# Patient Record
Sex: MALE | Race: WHITE
[De-identification: names, ages, dates, MRNs, and addresses within clinical notes are randomized per-mention and may not be internally consistent; named-entity substitution may affect disease eponyms.]

---

## 2018-07-16 ENCOUNTER — HOSPITAL ENCOUNTER (OUTPATIENT)
Dept: HOSPITAL 88 - OR | Age: 58
Discharge: HOME | End: 2018-07-16
Attending: INTERNAL MEDICINE
Payer: COMMERCIAL

## 2018-07-16 DIAGNOSIS — Z80.0: ICD-10-CM

## 2018-07-16 DIAGNOSIS — D12.2: ICD-10-CM

## 2018-07-16 DIAGNOSIS — Z12.11: Primary | ICD-10-CM

## 2018-07-16 DIAGNOSIS — K57.30: ICD-10-CM

## 2018-07-16 DIAGNOSIS — K31.7: ICD-10-CM

## 2018-07-16 DIAGNOSIS — Q40.2: ICD-10-CM

## 2018-07-16 DIAGNOSIS — M21.372: ICD-10-CM

## 2018-07-16 DIAGNOSIS — Z86.73: ICD-10-CM

## 2018-07-16 DIAGNOSIS — K31.89: ICD-10-CM

## 2018-07-16 DIAGNOSIS — K29.70: ICD-10-CM

## 2018-07-16 DIAGNOSIS — D12.0: ICD-10-CM

## 2018-07-16 DIAGNOSIS — K20.9: ICD-10-CM

## 2018-07-16 DIAGNOSIS — K64.8: ICD-10-CM

## 2018-07-16 DIAGNOSIS — Z01.810: ICD-10-CM

## 2018-07-16 DIAGNOSIS — K25.9: ICD-10-CM

## 2018-07-16 DIAGNOSIS — Z79.82: ICD-10-CM

## 2018-07-16 DIAGNOSIS — R03.0: ICD-10-CM

## 2018-07-16 PROCEDURE — 45384 COLONOSCOPY W/LESION REMOVAL: CPT

## 2018-07-16 PROCEDURE — 45385 COLONOSCOPY W/LESION REMOVAL: CPT

## 2018-07-16 PROCEDURE — 45378 DIAGNOSTIC COLONOSCOPY: CPT

## 2018-07-16 PROCEDURE — 43239 EGD BIOPSY SINGLE/MULTIPLE: CPT

## 2018-07-16 PROCEDURE — 93005 ELECTROCARDIOGRAM TRACING: CPT

## 2018-07-16 NOTE — OPERATIVE REPORT
DATE OF PROCEDURE:  July 16, 2018 



REFERRING PHYSICIAN:  Dr. Tr Roberto

 

PROCEDURES PERFORMED

1. Esophagogastroduodenoscopy with biopsies.  

2. Colonoscopy with polypectomy.  



INDICATIONS FOR EGD:  Dyspepsia.  



INDICATIONS FOR COLONOSCOPY:  Colorectal cancer screening.



MEDICATION:  Patient was done under MAC.  Please see anesthesiologist's 

note.



PROCEDURE:  With the patient in the left lateral decubitus position, the 

flexible fiberoptic Olympus gastroscope was introduced into the esophagus 

under direct visualization without any difficulty.  There was some patchy 

erythema noted in the distal esophagus.  The scope was then advanced with 

ease into the stomach.  The mucosa overlying the antrum and the body 

revealed  low-grade to moderate edema, and biopsies were obtained and sent 

to stain for H. pylori.  An approximately 1.2-cm, sessile, polypoid lesion 

with a minute ulcer was noted also in the antrum, and biopsies were 

obtained.  The pylorus was of normal contour and shape.  It was intubated 

with ease, and the scope was advanced all the way to the 2nd portion of the 

duodenum.  The scope was then withdrawn slowly.  Mucosa overlying the 

proximal 2nd portion appeared to be within normal limits.  A cluster of 

nodules was noted along the anterior wall of the duodenal bulb, and 

biopsies were obtained.  The scope was then withdrawn back into the stomach 

and retroflexed.  Mucosa overlying the fundus and the cardia appeared to be 

within normal limits.  The scope was then straightened out.  The stomach 

was decompressed.  Scope was subsequently withdrawn.  Patient tolerated the 

procedure well.



IMPRESSION

1. Distal esophagitis, mild.

2. Gastritis, biopsied.  Biopsies sent to stain for H. pylori.

3. Duodenal bulb, clusters of nodules, anterior wall, biopsied.



PLAN:  Follow up histology.  Initiate Protonix 40 mg 1 p.o. q.a.m. a.c.



The patient was then turned around.  After adequate lubrication of the anal 

canal, a flexible fiberoptic Olympus colonoscope was inserted into the 

rectum with ease and advanced all the way to the cecum.  One polyp was 

snared from the cecum.  The scope was then withdrawn slowly, and a minute 

polyp was hot biopsied from the ascending colon.  The transverse appeared 

to be within normal limits.  Diverticular disease was noted to involve the 

distal descending and the sigmoid colon.  The rectum appeared to be within 

normal limits.  The scope was then retroflexed into the distal rectum, and 

small internal hemorrhoids were noted, none of which was actively bleeding. 

 The scope was then straightened out.  The rectosigmoid area as well as the 

distal rectal area were decompressed.  Scope was subsequently withdrawn.  

Patient tolerated the procedure well.  



IMPRESSION

1. Cecal polyp, snared.  

2. Ascending colon polyp, hot biopsied.

3. Diverticulosis.

4. Internal hemorrhoids, none actively bleeding.  



PLAN:  Follow up histology.  Initiate high-fiber, low-fat diet.  Initiate 

high-fiber supplement.  Patient might benefit from a followup colonoscopy 

in 3 to 5 years.  





DD:  07/16/2018 11:20

DT:  07/16/2018 11:36

Job#:  [id number] 



cc:TR ROBERTO MD

## 2018-08-19 ENCOUNTER — HOSPITAL ENCOUNTER (OUTPATIENT)
Dept: HOSPITAL 88 - ER | Age: 58
Setting detail: OBSERVATION
LOS: 1 days | Discharge: HOME | End: 2018-08-20
Attending: INTERNAL MEDICINE | Admitting: INTERNAL MEDICINE
Payer: COMMERCIAL

## 2018-08-19 VITALS — BODY MASS INDEX: 27.64 KG/M2 | HEIGHT: 70 IN | WEIGHT: 193.06 LBS

## 2018-08-19 VITALS — DIASTOLIC BLOOD PRESSURE: 70 MMHG | SYSTOLIC BLOOD PRESSURE: 127 MMHG

## 2018-08-19 VITALS — DIASTOLIC BLOOD PRESSURE: 78 MMHG | SYSTOLIC BLOOD PRESSURE: 128 MMHG

## 2018-08-19 VITALS — DIASTOLIC BLOOD PRESSURE: 86 MMHG | SYSTOLIC BLOOD PRESSURE: 176 MMHG

## 2018-08-19 VITALS — DIASTOLIC BLOOD PRESSURE: 66 MMHG | SYSTOLIC BLOOD PRESSURE: 115 MMHG

## 2018-08-19 DIAGNOSIS — R94.31: ICD-10-CM

## 2018-08-19 DIAGNOSIS — R01.1: ICD-10-CM

## 2018-08-19 DIAGNOSIS — I34.0: ICD-10-CM

## 2018-08-19 DIAGNOSIS — I50.32: ICD-10-CM

## 2018-08-19 DIAGNOSIS — I11.0: ICD-10-CM

## 2018-08-19 DIAGNOSIS — Z82.49: ICD-10-CM

## 2018-08-19 DIAGNOSIS — I69.354: ICD-10-CM

## 2018-08-19 DIAGNOSIS — R55: ICD-10-CM

## 2018-08-19 DIAGNOSIS — I20.8: Primary | ICD-10-CM

## 2018-08-19 LAB
ALBUMIN SERPL-MCNC: 4.2 G/DL (ref 3.5–5)
ALBUMIN/GLOB SERPL: 1.2 {RATIO} (ref 0.8–2)
ALP SERPL-CCNC: 53 IU/L (ref 40–150)
ALT SERPL-CCNC: 20 IU/L (ref 0–55)
ANION GAP SERPL CALC-SCNC: 15 MMOL/L (ref 8–16)
BACTERIA URNS QL MICRO: (no result) /HPF
BASOPHILS # BLD AUTO: 0 10*3/UL (ref 0–0.1)
BASOPHILS NFR BLD AUTO: 0.5 % (ref 0–1)
BILIRUB UR QL: NEGATIVE
BUN SERPL-MCNC: 13 MG/DL (ref 7–26)
BUN/CREAT SERPL: 15 (ref 6–25)
CALCIUM SERPL-MCNC: 9.3 MG/DL (ref 8.4–10.2)
CHLORIDE SERPL-SCNC: 103 MMOL/L (ref 98–107)
CHOLEST SERPL-MCNC: 208 MD/DL (ref 0–199)
CHOLEST/HDLC SERPL: 2.3 {RATIO} (ref 3.9–4.7)
CK MB SERPL-MCNC: 2.5 NG/ML (ref 0–5)
CK MB SERPL-MCNC: 3.8 NG/ML (ref 0–5)
CK SERPL-CCNC: 145 IU/L (ref 30–200)
CK SERPL-CCNC: 99 IU/L (ref 30–200)
CLARITY UR: CLEAR
CO2 SERPL-SCNC: 25 MMOL/L (ref 22–29)
COLOR UR: YELLOW
DEPRECATED APTT PLAS QN: 27.7 SECONDS (ref 23.8–35.5)
DEPRECATED INR PLAS: 1.15
DEPRECATED NEUTROPHILS # BLD AUTO: 3.7 10*3/UL (ref 2.1–6.9)
DEPRECATED RBC URNS MANUAL-ACNC: (no result) /HPF (ref 0–5)
EGFRCR SERPLBLD CKD-EPI 2021: > 60 ML/MIN (ref 60–?)
EOSINOPHIL # BLD AUTO: 0.2 10*3/UL (ref 0–0.4)
EOSINOPHIL NFR BLD AUTO: 2.6 % (ref 0–6)
EPI CELLS URNS QL MICRO: (no result) /LPF
ERYTHROCYTE [DISTWIDTH] IN CORD BLOOD: 12.6 % (ref 11.7–14.4)
GLOBULIN PLAS-MCNC: 3.4 G/DL (ref 2.3–3.5)
GLUCOSE SERPLBLD-MCNC: 111 MG/DL (ref 74–118)
HCT VFR BLD AUTO: 44.9 % (ref 38.2–49.6)
HDLC SERPL-MSCNC: 89 MG/DL (ref 40–60)
HGB BLD-MCNC: 15.1 G/DL (ref 14–18)
KETONES UR QL STRIP.AUTO: NEGATIVE
LDLC SERPL CALC-MCNC: 104 MG/DL (ref 60–130)
LEUKOCYTE ESTERASE UR QL STRIP.AUTO: NEGATIVE
LYMPHOCYTES # BLD: 1.3 10*3/UL (ref 1–3.2)
LYMPHOCYTES NFR BLD AUTO: 22.7 % (ref 18–39.1)
MCH RBC QN AUTO: 32.1 PG (ref 28–32)
MCHC RBC AUTO-ENTMCNC: 33.6 G/DL (ref 31–35)
MCV RBC AUTO: 95.5 FL (ref 81–99)
MONOCYTES # BLD AUTO: 0.6 10*3/UL (ref 0.2–0.8)
MONOCYTES NFR BLD AUTO: 10.1 % (ref 4.4–11.3)
NEUTS SEG NFR BLD AUTO: 63.8 % (ref 38.7–80)
NITRITE UR QL STRIP.AUTO: NEGATIVE
PLATELET # BLD AUTO: 189 X10E3/UL (ref 140–360)
POTASSIUM SERPL-SCNC: 4 MMOL/L (ref 3.5–5.1)
PROT UR QL STRIP.AUTO: NEGATIVE
PROTHROMBIN TIME: 13.8 SECONDS (ref 11.9–14.5)
RBC # BLD AUTO: 4.7 X10E6/UL (ref 4.3–5.7)
SODIUM SERPL-SCNC: 139 MMOL/L (ref 136–145)
SP GR UR STRIP: 1.01 (ref 1.01–1.02)
TRIGL SERPL-MCNC: 77 MG/DL (ref 0–149)
UROBILINOGEN UR STRIP-MCNC: 0.2 MG/DL (ref 0.2–1)
WBC #/AREA URNS HPF: (no result) /HPF (ref 0–5)

## 2018-08-19 PROCEDURE — 93005 ELECTROCARDIOGRAM TRACING: CPT

## 2018-08-19 PROCEDURE — 99284 EMERGENCY DEPT VISIT MOD MDM: CPT

## 2018-08-19 PROCEDURE — 80053 COMPREHEN METABOLIC PANEL: CPT

## 2018-08-19 PROCEDURE — 85025 COMPLETE CBC W/AUTO DIFF WBC: CPT

## 2018-08-19 PROCEDURE — 36415 COLL VENOUS BLD VENIPUNCTURE: CPT

## 2018-08-19 PROCEDURE — 70450 CT HEAD/BRAIN W/O DYE: CPT

## 2018-08-19 PROCEDURE — 78452 HT MUSCLE IMAGE SPECT MULT: CPT

## 2018-08-19 PROCEDURE — 93017 CV STRESS TEST TRACING ONLY: CPT

## 2018-08-19 PROCEDURE — 84443 ASSAY THYROID STIM HORMONE: CPT

## 2018-08-19 PROCEDURE — 85730 THROMBOPLASTIN TIME PARTIAL: CPT

## 2018-08-19 PROCEDURE — 93306 TTE W/DOPPLER COMPLETE: CPT

## 2018-08-19 PROCEDURE — 82553 CREATINE MB FRACTION: CPT

## 2018-08-19 PROCEDURE — 80061 LIPID PANEL: CPT

## 2018-08-19 PROCEDURE — 80048 BASIC METABOLIC PNL TOTAL CA: CPT

## 2018-08-19 PROCEDURE — 84484 ASSAY OF TROPONIN QUANT: CPT

## 2018-08-19 PROCEDURE — 85610 PROTHROMBIN TIME: CPT

## 2018-08-19 PROCEDURE — 82550 ASSAY OF CK (CPK): CPT

## 2018-08-19 PROCEDURE — 93880 EXTRACRANIAL BILAT STUDY: CPT

## 2018-08-19 PROCEDURE — 81001 URINALYSIS AUTO W/SCOPE: CPT

## 2018-08-19 RX ADMIN — FAMOTIDINE SCH MG: 20 TABLET, FILM COATED ORAL at 23:45

## 2018-08-19 RX ADMIN — FAMOTIDINE SCH MG: 20 TABLET, FILM COATED ORAL at 13:06

## 2018-08-19 RX ADMIN — NITROGLYCERIN SCH GM: 20 OINTMENT TOPICAL at 17:01

## 2018-08-19 RX ADMIN — NITROGLYCERIN SCH GM: 20 OINTMENT TOPICAL at 13:07
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
## 2018-08-19 NOTE — CONSULTATION
DATE OF CONSULTATION:  August 19, 2018 





CARDIOLOGY CONSULTATION



REASON FOR CONSULTATION:  Left arm and shoulder pain, near syncope.



HISTORY OF PRESENT ILLNESS:  Mr. Brown is a 58-year-old gentleman with 

past medical history of subacute bacterial endocarditis diagnosed 4 years 

ago with complication of right MCA distribution stroke resulting in 

left-sided body weakness with majority of recovery with exception of some 

footdrop in his left foot.  Since that time, patient has changed his entire 

lifestyle.  He has taken quite a bit of interest in his health and is 

exercising most days of the week between 3:30 to 4:15 in the morning doing 

a combination of weight exercise and cardio training.  Patient reports 

typically he is able to do 30 minutes of treadmill exercise at a time 

without any issues; however, yesterday had an episode which scared him, 

prompting him to come.  He reports that with 15 minutes into his exercise 

protocol, patient felt weak, near syncopal, and had severe tightness in his 

left posterior neck and left shoulder region.  In fact, symptoms were so 

severe he ended up stopping.  He felt overall malaise and not quite himself 

and decided to terminate his exercise routine.  He spoke with his mom who 

is a nurse, who then recommended he should  himself checked out, 

hence coming into the emergency room.  Since he has been here, largely he 

is more or less back to baseline.  He does report some occasional left body 

pain that happens off and on in relation to his stroke.  He denies any 

orthopnea, lower extremity edema, or palpitations.  He has had blacking out 

episodes in the past many, many years ago.  As a child, he does report 

missing 6 weeks of school in the first grade and he thinks this may be 

related to his heart condition.



PAST MEDICAL HISTORY:

1. Childhood illness in first grade, was out for 6 weeks, questionable 

rheumatic disease.

2. Subacute bacterial endocarditis, July 15, 2013, underwent 6 weeks of 

antibiotic therapy, his course is complicated by stroke, resulting in 

left-sided body weakness with reasonable recovery with the exception of 

some slight left footdrop.

3. Esophagitis and gastritis, diagnosed on EGD in July of 2018.

4. Colonoscopy with polyps, done in July of 2018, have been removed.



FAMILY HISTORY:  Mother alive at 86, has history of prior syncope.  Father 

alive at 85 with moderate cognitive impairment and Parkinson's disease.



SOCIAL HISTORY:  He is a lifelong nonsmoker.  Has occasional alcohol.  

Denies any illicit drug use.



ALLERGIES:  NO KNOWN DRUG ALLERGIES.



HOME MEDICATIONS:

1. Ambien 10 mg nightly.

2. Protonix 40 mg daily.



REVIEW OF SYSTEMS:

GENERAL:  Positive for fatigue and malaise.  Denies any fevers, chills, 

weight changes.

HEENT:  No headaches, visual complaints, sore throat, stuffy nose.

RESPIRATORY:  Denies any pleuritic chest pain.  Denies any exertional 

dyspnea.

CARDIOVASCULAR:  As per HPI.

GI:  Denies any nausea, vomiting, bright red blood per rectum, melena, 

hematemesis.

:  Denies any dysuria, pyuria, or changes in urinary frequency.

MUSCULOSKELETAL:  Positive for left footdrop and slight left-sided body 

weakness related to stroke, and left-sided arm and leg pain from time to 

time, but denies any typical claudication symptoms or lower extremity 

edema.

SKIN:  No rashes or breakdown.

ID:  Denies any infectious issues except for the history of endocarditis.

NEUROLOGIC:  Positive for right MCA distribution stroke with left-sided 

body weakness.



Remainder of review of systems negative otherwise mentioned.



PHYSICAL EXAMINATION:

VITAL SIGNS:  Temperature of 97.7, pulse of 62, respiratory rate 18, blood 

pressure 131/72, O2 sat 97% on room air.

GENERAL:  This is a well-nourished, well-developed gentleman, who is 

currently in no apparent distress.

HEENT:  Pupils are equal, round, and reactive to light.  Extraocular 

movements are intact.  Oropharynx is clear.

NECK:  No elevation of jugular venous pulsation.  Faint right carotid bruit 

versus transmitted murmur.

CARDIOVASCULAR:  Regular rate and rhythm.  Normal S1 and S2.  3/6 systolic 

murmur at the left lower sternal border and apical region.

LUNGS:  Clear to auscultation bilaterally with good air entry.

ABDOMEN:  Skinny, nontender, nondistended with normoactive bowel sounds.  

No hepatosplenomegaly.

BACK:  No costovertebral angle tenderness.

EXTREMITIES:  Warm with 2+ bilateral radial pulses, 2+ bilateral femoral 

pulses, and 2+ pedal pulses.

NEUROLOGIC:  Cranial nerves II-XII are intact.  Strength is 5-/5 in the 

upper and lower extremity.  Right side is neurologically intact.



LABS:  White count of 5.9, hemoglobin 15.1, hematocrit 44.9, platelets of 

189,000.  Sodium 139, potassium 4.0, chloride 102, bicarb 25, BUN 13, 

creatinine is 0.84, glucose of 111, calcium of 9.3.  AST 20, ALT 20, alk 

phos 53.  Total protein 7.6, albumin of 4.2.  , MB of 3.8, troponin 

of 0.023.  CT brain shows old right MCA distribution stroke.  EKG reveals 

normal sinus rhythm and nonspecific T-wave inversions in the lateral 

precordial leads.



DIAGNOSES:

1. Questionable equivalent angina type episode.

2. Near syncope.

3. Systolic murmur in the mitral position concerning for mitral valve 

pathology.

4. History of bacterial endocarditis in the remote past, resulting in 

stroke and left-sided body weakness.

5. Abnormal electrocardiogram.



PLAN/RECOMMENDATIONS:

1. Will go ahead and check echocardiogram to evaluate his heart 

structurally.

2. Cycle serial cardiac enzymes.

3. Telemetry monitoring to look for any occult arrhythmias.

4. Proceed with nuclear cardiac stress test, may need to convert to a 

pharmacologic stress test on account of his neuro deficit specifically 

being his left footdrop.

5. Check TSH, lipid profile.

6. Will continue to monitor this patient.

7. Long discussion with patient who is agreeable with the plan.







DD:  08/19/2018 12:24

DT:  08/19/2018 20:31

Job#:  [id number] DR PAPPAS

## 2018-08-19 NOTE — DIAGNOSTIC IMAGING REPORT
Exam: Head CT without contrast

History:  Dizziness, CVA

Comparison studies:  None



Technique:

Axial images were obtained from the skull base to the vertex.

Coronal and sagittal images reconstructed from the axial data.

Radiation dose: Total DLP: 921 mGy*cm. Estimated effective dose: DLP x 0.015 

Intravenous contrast: None



Findings:



Scalp: No abnormalities.

Bones: No fractures, blastic or lytic lesions.



Brain sulci: Mildly prominent.

Ventricles: Ex vacuo dilatation of the atria of the left lateral ventricle due

to volume loss from chronic insult described below. Extra-axial spaces: No

masses, no fluid collection. 



Parenchyma: 

No mass, acute hemorrhage or acute cortical vascular insult. Chronic insult

with encephalomalacia and gliosis centered along the right supramarginal gyrus

and right side central gyrus in the right MCA territory. There are punctate

right perisylvian MCA vascular calcifications adjacent to the infarct. 



Sellar/suprasellar region: No abnormalities.

Craniocervical junction: Patent foramen magnum. No Chiari one malformation.



Incidental findings: 

Punctate left middle frontal calcification may be vascular in etiology or

possibly dystrophic related to previous infection/inflammation. Atherosclerotic

calcifications in the carotid siphons. Nonspecific right frontoethmoidal

portable sinus inflammatory mucosal thickening



IMPRESSION:

 

No acute intracranial abnormalities.



Chronic findings:

1.  Mild generalized volume loss.

2.  Right inferior parietal/subcentral vascular insult in the right MCA

territory.



Signed by: Dr. Franky Harding M.D. on 8/19/2018 11:04 AM

## 2018-08-20 VITALS — DIASTOLIC BLOOD PRESSURE: 71 MMHG | SYSTOLIC BLOOD PRESSURE: 128 MMHG

## 2018-08-20 VITALS — SYSTOLIC BLOOD PRESSURE: 128 MMHG | DIASTOLIC BLOOD PRESSURE: 71 MMHG

## 2018-08-20 VITALS — SYSTOLIC BLOOD PRESSURE: 111 MMHG | DIASTOLIC BLOOD PRESSURE: 58 MMHG

## 2018-08-20 VITALS — SYSTOLIC BLOOD PRESSURE: 140 MMHG | DIASTOLIC BLOOD PRESSURE: 78 MMHG

## 2018-08-20 VITALS — DIASTOLIC BLOOD PRESSURE: 72 MMHG | SYSTOLIC BLOOD PRESSURE: 154 MMHG

## 2018-08-20 VITALS — SYSTOLIC BLOOD PRESSURE: 154 MMHG | DIASTOLIC BLOOD PRESSURE: 72 MMHG

## 2018-08-20 VITALS — SYSTOLIC BLOOD PRESSURE: 116 MMHG | DIASTOLIC BLOOD PRESSURE: 60 MMHG

## 2018-08-20 LAB
ANION GAP SERPL CALC-SCNC: 14.2 MMOL/L (ref 8–16)
BASOPHILS # BLD AUTO: 0 10*3/UL (ref 0–0.1)
BASOPHILS NFR BLD AUTO: 0.4 % (ref 0–1)
BUN SERPL-MCNC: 14 MG/DL (ref 7–26)
BUN/CREAT SERPL: 16 (ref 6–25)
CALCIUM SERPL-MCNC: 9.3 MG/DL (ref 8.4–10.2)
CHLORIDE SERPL-SCNC: 102 MMOL/L (ref 98–107)
CHOLEST SERPL-MCNC: 171 MD/DL (ref 0–199)
CHOLEST/HDLC SERPL: 2.5 {RATIO} (ref 3.9–4.7)
CK MB SERPL-MCNC: 1.7 NG/ML (ref 0–5)
CK MB SERPL-MCNC: 1.8 NG/ML (ref 0–5)
CK SERPL-CCNC: 73 IU/L (ref 30–200)
CK SERPL-CCNC: 77 IU/L (ref 30–200)
CO2 SERPL-SCNC: 24 MMOL/L (ref 22–29)
DEPRECATED NEUTROPHILS # BLD AUTO: 5.2 10*3/UL (ref 2.1–6.9)
EGFRCR SERPLBLD CKD-EPI 2021: > 60 ML/MIN (ref 60–?)
EOSINOPHIL # BLD AUTO: 0.4 10*3/UL (ref 0–0.4)
EOSINOPHIL NFR BLD AUTO: 4.6 % (ref 0–6)
ERYTHROCYTE [DISTWIDTH] IN CORD BLOOD: 12.7 % (ref 11.7–14.4)
GLUCOSE SERPLBLD-MCNC: 104 MG/DL (ref 74–118)
HCT VFR BLD AUTO: 41.9 % (ref 38.2–49.6)
HDLC SERPL-MSCNC: 69 MG/DL (ref 40–60)
HGB BLD-MCNC: 14.2 G/DL (ref 14–18)
LDLC SERPL CALC-MCNC: 78 MG/DL (ref 60–130)
LYMPHOCYTES # BLD: 1.3 10*3/UL (ref 1–3.2)
LYMPHOCYTES NFR BLD AUTO: 16.9 % (ref 18–39.1)
MCH RBC QN AUTO: 32.3 PG (ref 28–32)
MCHC RBC AUTO-ENTMCNC: 33.9 G/DL (ref 31–35)
MCV RBC AUTO: 95.4 FL (ref 81–99)
MONOCYTES # BLD AUTO: 1 10*3/UL (ref 0.2–0.8)
MONOCYTES NFR BLD AUTO: 12.1 % (ref 4.4–11.3)
NEUTS SEG NFR BLD AUTO: 65.7 % (ref 38.7–80)
PLATELET # BLD AUTO: 172 X10E3/UL (ref 140–360)
POTASSIUM SERPL-SCNC: 4.2 MMOL/L (ref 3.5–5.1)
RBC # BLD AUTO: 4.39 X10E6/UL (ref 4.3–5.7)
SODIUM SERPL-SCNC: 136 MMOL/L (ref 136–145)
TRIGL SERPL-MCNC: 122 MG/DL (ref 0–149)
TSH SERPL DL<=0.005 MIU/L-ACNC: 1.76 UIU/ML (ref 0.35–4.94)

## 2018-08-20 RX ADMIN — NITROGLYCERIN SCH GM: 20 OINTMENT TOPICAL at 12:28

## 2018-08-20 RX ADMIN — FAMOTIDINE SCH MG: 20 TABLET, FILM COATED ORAL at 11:45

## 2018-08-20 RX ADMIN — NITROGLYCERIN SCH GM: 20 OINTMENT TOPICAL at 00:00

## 2018-08-20 RX ADMIN — NITROGLYCERIN SCH GM: 20 OINTMENT TOPICAL at 04:51

## 2018-08-22 NOTE — DISCHARGE SUMMARY
ADMIT DIAGNOSES

1. Equivalent angina type episode, questionable.

2. Exertional dizziness/near syncope.

3. Systolic murmur.

4. History of bacterial endocarditis resulting in right middle cerebral 

artery stroke in 2013.



DISCHARGE DIAGNOSES

1. Anginal equivalent, resolved.

2. Moderate mitral regurgitation.

3. History of middle cerebral arterial embolic stroke after bacterial 

endocarditis in 2013.

4. Hypertensive heart disease.

5. Chronic diastolic congestive heart failure. 



HOSPITAL COURSE:  This is a 58-year-old white man who was admitted to [facility] with a diagnosis of exertional 

dizziness and questionable equivalent anginal type episode.  He is also 

diagnosed with possible near syncope as well as a holosystolic murmur.  

This gentleman has a history of bacterial endocarditis resulting in a right 

MCA embolic stroke in 2013.  During this hospitalization, the patient 

underwent serial cardiac enzymes as well as electrocardiograms that did not 

reveal any evidence of acute myocardial ischemia or infarction.  During 

this hospitalization, the patient was found to have LDL cholesterol of 78 

mg.  The patient's TSH is normal at 1.762.  The patient underwent a 2D 

echocardiogram during this hospitalization that revealed a preserved left 

ventricular ejection fraction of 65% to 70%, but it did reveal diastolic 

filling pattern indicating impaired relaxation.  Moreover, the patient was 

found to have mild-to-moderate mitral regurgitation.  The patient's 

hospitalization was unremarkable.  During this hospitalization, the patient 

underwent a nuclear stress test which was completely normal.  The patient's 

condition on discharge was stable. 



During this hospitalization, the patient was evaluated by his cardiologist, 

namely Dr. Kvng Fall.  





DISCHARGE MEDICATIONS

1. Zolpidem 10 mg nightly.

2. Aspirin 81 mg daily.

3. Pantoprazole 40 mg daily.

4. Biotin once daily.

5. Tumeric once daily.



FOLLOWUP INSTRUCTIONS:  The patient is instructed to follow up with Dr. Kvng Fall, his cardiologist, within 1 to 2 weeks.  The patient is 

instructed to follow up with his primary care physician, namely myself, Dr. Tr Roberto, within 2 weeks.  The patient is instructed to go to nearest 

emergency room if he has any recurrence of symptoms.  The patient was 

instructed not to drink more than 2 alcoholic drinks in 24 hours.  Moreover 

during this hospitalization, the patient was informed that he may have to 

take a blood pressure medication in the near future, either beta blockers 

or ACE inhibitors. 







 _________________________________

TR ROBERTO MD



DD:  08/21/2018 19:17

DT:  08/21/2018 20:11

Job#:  [id number]



cc:KVNG FALL MD